# Patient Record
Sex: FEMALE
[De-identification: names, ages, dates, MRNs, and addresses within clinical notes are randomized per-mention and may not be internally consistent; named-entity substitution may affect disease eponyms.]

---

## 2018-06-25 ENCOUNTER — HOSPITAL ENCOUNTER (EMERGENCY)
Dept: HOSPITAL 97 - ER | Age: 48
Discharge: HOME | End: 2018-06-25
Payer: COMMERCIAL

## 2018-06-25 DIAGNOSIS — R53.1: ICD-10-CM

## 2018-06-25 DIAGNOSIS — R19.7: ICD-10-CM

## 2018-06-25 DIAGNOSIS — H82.9: ICD-10-CM

## 2018-06-25 DIAGNOSIS — Z88.0: ICD-10-CM

## 2018-06-25 DIAGNOSIS — R11.10: Primary | ICD-10-CM

## 2018-06-25 LAB
ALBUMIN SERPL BCP-MCNC: 3.9 G/DL (ref 3.4–5)
ALP SERPL-CCNC: 61 U/L (ref 45–117)
ALT SERPL W P-5'-P-CCNC: 32 U/L (ref 12–78)
AST SERPL W P-5'-P-CCNC: 19 U/L (ref 15–37)
BUN BLD-MCNC: 14 MG/DL (ref 7–18)
CKMB CREATINE KINASE MB: < 1 NG/ML (ref 0.3–3.6)
GLUCOSE SERPLBLD-MCNC: 123 MG/DL (ref 74–106)
HCT VFR BLD CALC: 41 % (ref 36–45)
INR BLD: 1.01
LIPASE SERPL-CCNC: 105 U/L (ref 73–393)
LYMPHOCYTES # SPEC AUTO: 0.9 K/UL (ref 0.7–4.9)
MAGNESIUM SERPL-MCNC: 1.9 MG/DL (ref 1.8–2.4)
MCH RBC QN AUTO: 29.3 PG (ref 27–35)
MCV RBC: 87.3 FL (ref 80–100)
MORPHOLOGY BLD-IMP: (no result)
NT-PROBNP SERPL-MCNC: 37 PG/ML (ref ?–125)
PMV BLD: 10.1 FL (ref 7.6–11.3)
POTASSIUM SERPL-SCNC: 3.6 MMOL/L (ref 3.5–5.1)
RBC # BLD: 4.69 M/UL (ref 3.86–4.86)

## 2018-06-25 PROCEDURE — 82962 GLUCOSE BLOOD TEST: CPT

## 2018-06-25 PROCEDURE — 36415 COLL VENOUS BLD VENIPUNCTURE: CPT

## 2018-06-25 PROCEDURE — 83735 ASSAY OF MAGNESIUM: CPT

## 2018-06-25 PROCEDURE — 80048 BASIC METABOLIC PNL TOTAL CA: CPT

## 2018-06-25 PROCEDURE — 82553 CREATINE MB FRACTION: CPT

## 2018-06-25 PROCEDURE — 93005 ELECTROCARDIOGRAM TRACING: CPT

## 2018-06-25 PROCEDURE — 96374 THER/PROPH/DIAG INJ IV PUSH: CPT

## 2018-06-25 PROCEDURE — 81025 URINE PREGNANCY TEST: CPT

## 2018-06-25 PROCEDURE — 81003 URINALYSIS AUTO W/O SCOPE: CPT

## 2018-06-25 PROCEDURE — 82550 ASSAY OF CK (CPK): CPT

## 2018-06-25 PROCEDURE — 85025 COMPLETE CBC W/AUTO DIFF WBC: CPT

## 2018-06-25 PROCEDURE — 83690 ASSAY OF LIPASE: CPT

## 2018-06-25 PROCEDURE — 80076 HEPATIC FUNCTION PANEL: CPT

## 2018-06-25 PROCEDURE — 84484 ASSAY OF TROPONIN QUANT: CPT

## 2018-06-25 PROCEDURE — 99284 EMERGENCY DEPT VISIT MOD MDM: CPT

## 2018-06-25 PROCEDURE — 83880 ASSAY OF NATRIURETIC PEPTIDE: CPT

## 2018-06-25 PROCEDURE — 96375 TX/PRO/DX INJ NEW DRUG ADDON: CPT

## 2018-06-25 PROCEDURE — 85610 PROTHROMBIN TIME: CPT

## 2018-06-25 PROCEDURE — 85730 THROMBOPLASTIN TIME PARTIAL: CPT

## 2018-06-25 PROCEDURE — 71045 X-RAY EXAM CHEST 1 VIEW: CPT

## 2018-06-25 PROCEDURE — 96361 HYDRATE IV INFUSION ADD-ON: CPT

## 2018-06-25 NOTE — ER
Nurse's Notes                                                                                     

 [facility]                                                                

Name: Cait Ace                                                                                  

Age: 47 yrs                                                                                       

Sex: Female                                                                                       

: 1970                                                                                   

MRN: [id number]                                                                                   

Arrival Date: 2018                                                                          

Time: 17:13                                                                                       

Account#: [id number]                                                                            

Bed 25                                                                                            

Private MD: None, None                                                                            

Diagnosis: Vomiting;Diarrhea, unspecified;Vertiginous syndromes in diseases classified elsewhere, 

  unspecified ear;Weakness                                                                        

                                                                                                  

Presentation:                                                                                     

                                                                                             

17:24 Presenting complaint: Patient states: dizziness, nausea, vomiting x1, diarrhea x2,      ss  

      chills that began 5.5 hours ago. Pt reports she feels as if the room is spinning when       

      she moves her head. Transition of care: patient was not received from another setting       

      of care. Onset of symptoms was 2018 at 12:00. Risk Assessment: Do you want to      

      hurt yourself or someone else? Patient reports no desire to harm self or others.            

      Initial Sepsis Screen: Does the patient meet any 2 criteria? No. Patient's initial          

      sepsis screen is negative. Does the patient have a suspected source of infection? No.       

      Patient's initial sepsis screen is negative. Care prior to arrival: None.                   

17:24 Method Of Arrival: Ambulatory                                                           ss  

17:24 Acuity: MIAN 3                                                                           ss  

                                                                                                  

Historical:                                                                                       

- Allergies:                                                                                      

17:26 PENICILLINS;                                                                            ss  

- Home Meds:                                                                                      

17:26 None [Active];                                                                          ss  

- PMHx:                                                                                           

17:26 None;                                                                                   ss  

- PSHx:                                                                                           

17:26 Knee surgery;                                                                           ss  

                                                                                                  

- Immunization history:: Adult Immunizations up to date.                                          

- Social history:: Smoking status: Patient/guardian denies using tobacco.                         

- Ebola Screening: : Patient denies exposure to infectious person Patient denies travel           

  to an Ebola-affected area in the 21 days before illness onset.                                  

                                                                                                  

                                                                                                  

Screenin:14 Abuse screen: Denies threats or abuse. Denies injuries from another. Nutritional        hb  

      screening: No deficits noted. Tuberculosis screening: No symptoms or risk factors           

      identified. Fall Risk None identified.                                                      

                                                                                                  

Assessment:                                                                                       

17:30 General: Appears in no apparent distress. Behavior is calm, cooperative. Pain: Denies   hb  

      pain. Neuro: Level of Consciousness is awake, alert, obeys commands, Oriented to            

      person, place, time, situation. Cardiovascular: Heart tones S1 S2 present Capillary         

      refill < 3 seconds Patient's skin is warm and dry. Respiratory: Airway is patent            

      Trachea midline Respiratory effort is even, unlabored, Respiratory pattern is regular,      

      symmetrical, Breath sounds are clear bilaterally. GI: Abdomen is non-distended, Bowel       

      sounds present X 4 quads. Abd is soft and non tender X 4 quads. Reports diarrhea,           

      nausea, vomiting. : No signs and/or symptoms were reported regarding the                  

      genitourinary system. EENT: No signs and/or symptoms were reported regarding the EENT       

      system. Derm: No signs and/or symptoms reported regarding the dermatologic system. Skin     

      is intact, is healthy with good turgor, Skin is pink, warm \T\ dry. Musculoskeletal: No     

      signs and/or symptoms reported regarding the musculoskeletal system.                        

18:25 Reassessment: Patient appears in no apparent distress at this time. No changes from     hb  

      previously documented assessment. Patient and/or family updated on plan of care and         

      expected duration. Pain level reassessed. Patient is alert, oriented x 3, equal             

      unlabored respirations, skin warm/dry/pink.                                                 

                                                                                                  

Vital Signs:                                                                                      

17:26  / 86; Pulse 75; Resp 17; Pulse Ox 99% on R/A; Height 5 ft. 2 in. (157.48 cm);    ss  

      Pain 0/10;                                                                                  

17:30 Temp 97.7(TE);                                                                          ss  

18:25  / 78; Pulse 89; Resp 16; Pulse Ox 100% on R/A;                                   hb  

19:17  / 78; Pulse 76; Resp 18; Temp 98.5; Pulse Ox 99% on R/A;                         aj  

                                                                                                  

ED Course:                                                                                        

17:13 Patient arrived in ED.                                                                  sb2 

17:13 None, None is Private Physician.                                                        sb2 

17:21 Milagro Mcrae, RN is Primary Nurse.                                                   hb  

17:25 Triage completed.                                                                       ss  

17:26 Arm band placed on right wrist.                                                         ss  

17:28 Alcon Valencia MD is Attending Physician.                                             aakash 

17:36 Inserted saline lock: 20 gauge in left antecubital area, using aseptic technique. Blood ss  

      collected. Patient maintains SpO2 saturation greater than 95% on room air.                  

17:45 Patient has correct armband on for positive identification. Placed in gown. Bed in low  hb  

      position. Call light in reach. Side rails up X 1.                                           

17:58 EKG done, by EKG tech. reviewed by Alcon Valencia MD.                                   sm3 

18:23 X-ray completed. Portable x-ray completed in exam room. Patient tolerated procedure     kc2 

      well.                                                                                       

18:24 XRAY Chest (1 view) In Process Unspecified.                                             EDMS

19:17 No provider procedures requiring assistance completed. IV discontinued, bleeding        aj  

      controlled, No redness/swelling at site. Pressure dressing applied.                         

                                                                                                  

Administered Medications:                                                                         

18:13 Drug: NS 0.9% 1000 ml Route: IV; Rate: 1 bolus; Site: left antecubital;                 hb  

19:21 Follow up: Response: No adverse reaction; IV Status: Order to discontinue infusion; IV  aj  

      Intake: 100ml                                                                               

18:13 Drug: Zofran 4 mg Route: IVP; Site: left antecubital;                                   hb  

18:54 Follow up: Response: No adverse reaction                                                hb  

18:54 Drug: Meclizine 50 mg Route: PO;                                                        hb  

19:21 Follow up: Response: No adverse reaction                                                aj  

18:54 Drug: NS 0.9% 1000 ml Route: IV; Rate: 1 bolus; Site: left antecubital;                 hb  

19:20 Follow up: Response: No adverse reaction; IV Status: Completed infusion; IV Intake:     aj  

      1000ml                                                                                      

18:54 Drug: Pepcid 20 mg Route: IVP; Site: left antecubital;                                  hb  

19:20 Follow up: Response: No adverse reaction                                                  

                                                                                                  

                                                                                                  

Point of Care Testing:                                                                            

      Blood Glucose:                                                                              

17:36 Blood Glucose: 119 mg/dL;                                                               ss  

      Ranges:                                                                                     

                                                                                                  

Intake:                                                                                           

19:20 IV: 1000ml; Total: 1000ml.                                                              aj  

19:21 IV: 100ml; Total: 1100ml.                                                               aj  

                                                                                                  

Outcome:                                                                                          

19:07 Discharge ordered by MD.                                                                aakash 

19:17 Discharged to home ambulatory, with family.                                             aj  

19:17 Condition: good                                                                             

19:17 Discharge instructions given to patient, Instructed on discharge instructions, follow       

      up and referral plans. medication usage, Demonstrated understanding of instructions,        

      follow-up care, medications, Prescriptions given X 3.                                       

19:22 Patient left the ED.                                                                    aj  

                                                                                                  

Signatures:                                                                                       

Dispatcher [employer]                           EDAmna Irvin RN RN aj Anderson, Corey, MD MD cha Smirch, Shelby, RN RN                                                      

Milagro Mcrae RN RN hb Carr, Kelsie                                 kc2                                                  

Thao Jean                               sb2                                                  

Mendy Bermudez                              3                                                  

                                                                                                  

**************************************************************************************************

## 2018-06-25 NOTE — EKG
Test Date:    2018-06-25               Test Time:    17:52:14

Technician:   RANJAN                                     

                                                     

MEASUREMENT RESULTS:                                       

Intervals:                                           

Rate:         70                                     

IL:           170                                    

QRSD:         90                                     

QT:           422                                    

QTc:          455                                    

Axis:                                                

P:            31                                     

IL:           170                                    

QRS:          7                                      

T:            35                                     

                                                     

INTERPRETIVE STATEMENTS:                                       

                                                     

Normal sinus rhythm

Normal ECG

No previous ECG available for comparison



Electronically Signed On 06-25-18 20:51:37 CDT by Raleigh Mccurdy

## 2018-06-25 NOTE — RAD REPORT
EXAM DESCRIPTION:  RAD - Chest Single View - 6/25/2018 6:24 pm

 

CLINICAL HISTORY:  COUGH

Chest pain.

 

COMPARISON:  No comparisons

 

FINDINGS:  Portable technique limits examination quality.

 

The lungs are grossly clear. The heart is normal in size. No displaced fractures.

 

IMPRESSION:  No acute intrathoracic process suspected.

## 2018-06-25 NOTE — EDPHYS
Physician Documentation                                                                           

 [facility]                                                                

Name: Cait Ace                                                                                  

Age: 47 yrs                                                                                       

Sex: Female                                                                                       

: 1970                                                                                   

MRN: [id number]                                                                                   

Arrival Date: 2018                                                                          

Time: 17:13                                                                                       

Account#: [id number]                                                                            

Bed 25                                                                                            

Private MD: None, None                                                                            

ED Physician Alcon Valencia                                                                      

HPI:                                                                                              

                                                                                             

18:35 This 47 yrs old  Female presents to ER via Ambulatory with complaints of        aakash 

      Weakness, Nausea/Vomiting/Diarrhea.                                                         

                                                                                                  

Historical:                                                                                       

- Allergies:                                                                                      

17:26 PENICILLINS;                                                                            ss  

- Home Meds:                                                                                      

17:26 None [Active];                                                                          ss  

- PMHx:                                                                                           

17:26 None;                                                                                   ss  

- PSHx:                                                                                           

17:26 Knee surgery;                                                                           ss  

                                                                                                  

- Immunization history:: Adult Immunizations up to date.                                          

- Social history:: Smoking status: Patient/guardian denies using tobacco.                         

- Ebola Screening: : Patient denies exposure to infectious person Patient denies travel           

  to an Ebola-affected area in the 21 days before illness onset.                                  

                                                                                                  

                                                                                                  

ROS:                                                                                              

18:35 Constitutional: Negative for fever, chills, and weight loss, Eyes: Negative for injury, aakash 

      pain, redness, and discharge, ENT: Negative for injury, pain, and discharge, Neck:          

      Negative for injury, pain, and swelling, Cardiovascular: Negative for chest pain,           

      palpitations, and edema, Respiratory: Negative for shortness of breath, cough,              

      wheezing, and pleuritic chest pain, Back: Negative for injury and pain, : Negative        

      for injury, bleeding, discharge, and swelling, MS/Extremity: Negative for injury and        

      deformity, Skin: Negative for injury, rash, and discoloration, Psych: Negative for          

      depression, anxiety, suicide ideation, homicidal ideation, and hallucinations,              

      Allergy/Immunology: Negative for hives, rash, and allergies, Endocrine: Negative for        

      neck swelling, polydipsia, polyuria, polyphagia, and marked weight changes,                 

      Hematologic/Lymphatic: Negative for swollen nodes, abnormal bleeding, and unusual           

      bruising.                                                                                   

18:35 Abdomen/GI: Positive for nausea and vomiting, diarrhea.                                     

18:35 Neuro: Positive for dizziness, weakness.                                                    

                                                                                                  

Exam:                                                                                             

18:35 Constitutional:  This is a well developed, well nourished patient who is awake, alert,  aakash 

      and in no acute distress. Head/Face:  Normocephalic, atraumatic. Eyes:  Pupils equal        

      round and reactive to light, extra-ocular motions intact.  Lids and lashes normal.          

      Conjunctiva and sclera are non-icteric and not injected.  Cornea within normal limits.      

      Periorbital areas with no swelling, redness, or edema. ENT:  Nares patent. No nasal         

      discharge, no septal abnormalities noted.  Tympanic membranes are normal and external       

      auditory canals are clear.  Oropharynx with no redness, swelling, or masses, exudates,      

      or evidence of obstruction, uvula midline.  Mucous membranes moist. Neck:  Trachea          

      midline, no thyromegaly or masses palpated, and no cervical lymphadenopathy.  Supple,       

      full range of motion without nuchal rigidity, or vertebral point tenderness.  No            

      Meningismus. Chest/axilla:  Normal chest wall appearance and motion.  Nontender with no     

      deformity.  No lesions are appreciated. Cardiovascular:  Regular rate and rhythm with a     

      normal S1 and S2.  No gallops, murmurs, or rubs.  Normal PMI, no JVD.  No pulse             

      deficits. Respiratory:  Lungs have equal breath sounds bilaterally, clear to                

      auscultation and percussion.  No rales, rhonchi or wheezes noted.  No increased work of     

      breathing, no retractions or nasal flaring. Back:  No spinal tenderness.  No                

      costovertebral tenderness.  Full range of motion. Female :  Normal external               

      genitalia. Skin:  Warm, dry with normal turgor.  Normal color with no rashes, no            

      lesions, and no evidence of cellulitis. MS/ Extremity:  Pulses equal, no cyanosis.          

      Neurovascular intact.  Full, normal range of motion. Neuro:  Awake and alert, GCS 15,       

      oriented to person, place, time, and situation.  Cranial nerves II-XII grossly intact.      

      Motor strength 5/5 in all extremities.  Sensory grossly intact.  Cerebellar exam            

      normal.  Normal gait. Psych:  Awake, alert, with orientation to person, place and time.     

       Behavior, mood, and affect are within normal limits.                                       

18:35 Abdomen/GI: Inspection: abdomen appears normal, Bowel sounds: normal, Palpation:            

      nontender, Liver: no appreciated palpable abnormalities, Hernia: not appreciated.           

19:06 Neuro: Orientation: is normal, appropriate for stated age, no acute changes, Mentation: aakash 

      is normal, appropriate for stated age, no acute changes, Memory: is normal, appropriate     

      for stated age, no acute changes, Cranial nerves: grossly normal, is grossly normal         

      based on the patient's age, no acute changes, Cerebellar function: is grossly normal,       

      is grossly normal based on the patient's age, no acute changes, Motor: is grossly           

      normal based on the patient's age, no acute changes, moves all fours, Sensation: no         

      obvious gross deficits, appropriate  no acute changes, Gait: is steady, appropriate for     

      age, Deep tendon reflexes are 2+ (normal) in the  bilateral brachioradialis, bicep,         

      tricep and patellar and Achilles tendons, Babinski testing is normal, seizure activity,     

      is not displayed by the patient.                                                            

                                                                                                  

Vital Signs:                                                                                      

17:26  / 86; Pulse 75; Resp 17; Pulse Ox 99% on R/A; Height 5 ft. 2 in. (157.48 cm);    ss  

      Pain 0/10;                                                                                  

17:30 Temp 97.7(TE);                                                                          ss  

18:25  / 78; Pulse 89; Resp 16; Pulse Ox 100% on R/A;                                   hb  

19:17  / 78; Pulse 76; Resp 18; Temp 98.5; Pulse Ox 99% on R/A;                         aj  

                                                                                                  

MDM:                                                                                              

17:28 Patient medically screened.                                                             [facility] 

18:37 Data reviewed: vital signs, nurses notes, lab test result(s), EKG, radiologic studies,  [facility] 

      plain films.                                                                                

                                                                                                  

                                                                                             

17:41 Order name: Basic Metabolic Panel; Complete Time: 19:06                                 [facility] 

                                                                                             

17:41 Order name: CBC with Diff                                                               [facility] 

                                                                                             

17:41 Order name: Ckmb; Complete Time: 19:06                                                  [facility] 

                                                                                             

17:41 Order name: CPK; Complete Time: 19:06                                                   [facility] 

                                                                                             

17:41 Order name: LFT's; Complete Time: 19:06                                                 [facility] 

                                                                                             

17:41 Order name: Magnesium; Complete Time: 19:06                                             [facility] 

                                                                                             

17:41 Order name: NT PRO-BNP; Complete Time: 19:06                                            [facility] 

                                                                                             

17:41 Order name: PT-INR                                                                      [facility] 

                                                                                             

17:41 Order name: Ptt, Activated                                                              [facility] 

                                                                                             

17:41 Order name: Troponin (emerg Dept Use Only); Complete Time: 19:06                        [facility] 

                                                                                             

17:41 Order name: Lipase; Complete Time: 19:06                                                [facility] 

                                                                                             

17:41 Order name: Urine Pregnancy Test (obtain specimen); Complete Time: 18:55                [facility] 

                                                                                             

17:41 Order name: XRAY Chest (1 view); Complete Time: 19:06                                   [facility] 

                                                                                             

17:41 Order name: EKG; Complete Time: 17:42                                                   [facility] 

                                                                                             

17:41 Order name: Cardiac monitoring; Complete Time: 18:14                                    [facility] 

                                                                                             

17:41 Order name: EKG - Nurse/Tech; Complete Time: 18:14                                      [facility] 

                                                                                             

17:41 Order name: IV Saline Lock; Complete Time: 18:14                                        [facility] 

                                                                                             

17:41 Order name: Labs collected and sent; Complete Time: 18:14                               [facility] 

                                                                                             

17:41 Order name: O2 Per Protocol; Complete Time: 18:14                                       [facility] 

                                                                                             

18:48 Order name: Urine Dipstick--Ancillary (enter results)                                     

                                                                                             

18:48 Order name: Urine Pregnancy--Ancillary (enter results)                                    

                                                                                             

17:41 Order name: O2 Sat Monitoring; Complete Time: 18:14                                     [facility] 

                                                                                             

17:41 Order name: Urine Dipstick-Ancillary (obtain specimen); Complete Time: 19:11            [facility] 

                                                                                             

18:41 Order name: Labs - recollect needed; Complete Time: 19:08                               bd  

                                                                                                  

Administered Medications:                                                                         

18:13 Drug: NS 0.9% 1000 ml Route: IV; Rate: 1 bolus; Site: left antecubital;                 hb  

19:21 Follow up: Response: No adverse reaction; IV Status: Order to discontinue infusion; IV  aj  

      Intake: 100ml                                                                               

18:13 Drug: Zofran 4 mg Route: IVP; Site: left antecubital;                                   hb  

18:54 Follow up: Response: No adverse reaction                                                hb  

18:54 Drug: Meclizine 50 mg Route: PO;                                                        hb  

19:21 Follow up: Response: No adverse reaction                                                aj  

18:54 Drug: NS 0.9% 1000 ml Route: IV; Rate: 1 bolus; Site: left antecubital;                 hb  

19:20 Follow up: Response: No adverse reaction; IV Status: Completed infusion; IV Intake:     aj  

      1000ml                                                                                      

18:54 Drug: Pepcid 20 mg Route: IVP; Site: left antecubital;                                  hb  

19:20 Follow up: Response: No adverse reaction                                                  

                                                                                                  

                                                                                                  

Point of Care Testing:                                                                            

      Blood Glucose:                                                                              

17:36 Blood Glucose: 119 mg/dL;                                                               ss  

      Ranges:                                                                                     

      Critical Glucose Levels:Adult <50 mg/dl or >400 mg/dl  <40 mg/dl or >180 mg/dl       

Disposition:                                                                                      

18 19:07 Discharged to Home. Impression: Vomiting, Diarrhea, unspecified, Vertiginous       

  syndromes in diseases classified elsewhere, unspecified ear, Weakness.                          

- Condition is Stable.                                                                            

- Discharge Instructions: Food Choices to Help Relieve Diarrhea, Adult, Diarrhea,                 

  Nausea and Vomiting, Vertigo, Weakness, Nausea and Vomiting, Easy-to-Read, Diarrhea,            

  Easy-to-Read, Vertigo, Easy-to-Read, Weakness, Easy-to-Read.                                    

- Prescriptions for Meclizine 25 mg Oral Tablet - take 1 tablet by ORAL route every 8             

  hours As needed; 30 tablet. Pepcid 20 mg Oral Tablet - take 1 tablet by ORAL route              

  every 12 hours for 10 days; 20 tablet. Zofran 4 mg Oral Tablet - take 1 tablet by               

  ORAL route every 12 hours As needed; 20 tablet.                                                 

- Medication Reconciliation Form, Thank You Letter, Antibiotic Education, Prescription            

  Opioid Use form.                                                                                

- Follow up: Private Physician; When: 2 - 3 days; Reason: Recheck today's complaints,             

  Continuance of care, Re-evaluation by your physician.                                           

- Problem is new.                                                                                 

- Symptoms have improved.                                                                         

                                                                                                  

                                                                                                  

                                                                                                  

Signatures:                                                                                       

Dispatcher [employer]                           [facility]                                                 

Monica Dominguez Amanda, DARREL                       RN   Alcon Castañeda MD MD cha Smirch, Shelby, RN RN ss Baxter, Heather, RN RN                                                      

                                                                                                  

Corrections: (The following items were deleted from the chart)                                    

17:43 17:42 Occult Blood+[id number].BRZ ordered. [facility]                                             EDMS

19:22 19:07 2018 19:07 Discharged to Home. Impression: Vomiting; Diarrhea, unspecified; aj  

      Vertiginous syndromes in diseases classified elsewhere, unspecified ear; Weakness.          

      Condition is Stable. Discharge Instructions: Food Choices to Help Relieve Diarrhea,         

      Adult, Diarrhea, Nausea and Vomiting, Vertigo, Weakness, Nausea and Vomiting,               

      Easy-to-Read, Diarrhea, Easy-to-Read, Vertigo, Easy-to-Read, Weakness, Easy-to-Read.        

      Prescriptions for Meclizine 25 mg Oral Tablet - take 1 tablet by ORAL route every 8         

      hours As needed; 30 tablet, Pepcid 20 mg Oral Tablet - take 1 tablet by ORAL route          

      every 12 hours for 10 days; 20 tablet, Zofran 4 mg Oral Tablet - take 1 tablet by ORAL      

      route every 12 hours As needed; 20 tablet. and Forms are Medication Reconciliation          

      Form, Thank You Letter, Antibiotic Education, Prescription Opioid Use. Follow up:           

      Private Physician; When: 2 - 3 days; Reason: Recheck today's complaints, Continuance of     

      care, Re-evaluation by your physician. Problem is new. Symptoms have improved. aakash          

                                                                                                  

**************************************************************************************************

## 2020-07-08 ENCOUNTER — HOSPITAL ENCOUNTER (EMERGENCY)
Dept: HOSPITAL 97 - ER | Age: 50
Discharge: HOME | End: 2020-07-08
Payer: COMMERCIAL

## 2020-07-08 VITALS — OXYGEN SATURATION: 100 % | SYSTOLIC BLOOD PRESSURE: 128 MMHG | DIASTOLIC BLOOD PRESSURE: 84 MMHG

## 2020-07-08 VITALS — TEMPERATURE: 98.9 F

## 2020-07-08 DIAGNOSIS — H82.9: ICD-10-CM

## 2020-07-08 DIAGNOSIS — R42: Primary | ICD-10-CM

## 2020-07-08 DIAGNOSIS — Z88.0: ICD-10-CM

## 2020-07-08 PROCEDURE — 99283 EMERGENCY DEPT VISIT LOW MDM: CPT

## 2020-07-08 NOTE — ER
Nurse's Notes                                                                                     

 [facility]                                                                 

Name: Cait Ace                                                                                  

Age: 50 yrs                                                                                       

Sex: Female                                                                                       

: 1970                                                                                   

MRN: [id number]                                                                                   

Arrival Date: 2020                                                                          

Time: 09:35                                                                                       

Account#: [id number]                                                                            

Bed 3                                                                                             

Private MD:                                                                                       

Diagnosis: Vertiginous syndromes in diseases classified elsewhere                                 

                                                                                                  

Presentation:                                                                                     

                                                                                             

09:40 Chief complaint: Patient states: Dizziness and nausea that began 2 days ago. Is worse   ss  

      when moving head or standing up too fast. Pt reports a history of vertigo and reports       

      this feels similar and she is trying to catch it early and get the medications that         

      worked last time because they are going out of town soon. Coronavirus screen: Proceed       

      with normal triage. Patient denies a cough. Patient denies shortness of breath or           

      difficulty breathing. Patient denies measured and/or subjective temperature greater         

      than 100.4F prior to today's visit. Patient denies travel on a cruise ship or to a          

      country the [facility] currently lists as an affected area. Patient denies contact with known      

      and/or suspected case of COVID-19. Ebola Screen: Patient denies exposure to infectious      

      person. Patient denies travel to an Ebola-affected area in the 21 days before illness       

      onset. Initial Sepsis Screen: Does the patient meet any 2 criteria? No. Patient's           

      initial sepsis screen is negative. Does the patient have a suspected source of              

      infection? No. Patient's initial sepsis screen is negative. Risk Assessment: Do you         

      want to hurt yourself or someone else? Patient reports no desire to harm self or            

      others. Onset of symptoms was 2020.                                                

09:40 Method Of Arrival: Ambulatory                                                           ss  

09:40 Acuity: IMNA 3                                                                           ss  

                                                                                                  

Historical:                                                                                       

- Allergies:                                                                                      

09:51 PENICILLINS;                                                                            ss  

- Home Meds:                                                                                      

09:51 None [Active];                                                                          ss  

- PMHx:                                                                                           

09:51 Vertigo;                                                                                ss  

- PSHx:                                                                                           

09:51 Knee surgery;                                                                           ss  

                                                                                                  

- Immunization history:: Adult Immunizations up to date.                                          

- Social history:: Smoking status: Patient denies any tobacco usage or history of.                

                                                                                                  

                                                                                                  

Screenin:42 Abuse screen: Denies threats or abuse. Denies injuries from another. Nutritional        sv  

      screening: No deficits noted. Tuberculosis screening: No symptoms or risk factors           

      identified. Fall Risk                                                                       

                                                                                                  

Assessment:                                                                                       

11:35 General: Appears in no apparent distress. comfortable, well groomed, well developed,    sv  

      Behavior is calm, cooperative, appropriate for age. Pain: Denies pain. Neuro: Level of      

      Consciousness is awake, alert, obeys commands, Oriented to person, place, time,             

      situation, Moves all extremities. Full function Gait is steady, Speech is normal,           

      Facial symmetry appears normal, Reports dizziness. Respiratory: Airway is patent            

      Respiratory effort is even, unlabored, Respiratory pattern is regular, symmetrical. GI:     

      Reports nausea. Derm: Skin is pink, warm \T\ dry. Musculoskeletal: Range of motion:         

      intact in all extremities.                                                                  

12:05 Reassessment: Patient appears in no apparent distress at this time. No changes from     sv  

      previously documented assessment. Patient and/or family updated on plan of care and         

      expected duration. Pain level reassessed. Patient is alert, oriented x 3, equal             

      unlabored respirations, skin warm/dry/pink.                                                 

                                                                                                  

Vital Signs:                                                                                      

09:40  / 87; Pulse 57; Resp 15; Temp 98.9; Pulse Ox 96% on R/A; Weight 69.85 kg; Height ss  

      5 ft. 2 in. (157.48 cm); Pain 0/10;                                                         

11:41  / 84; Pulse 56; Resp 18; Pulse Ox 100% ;                                         sv  

09:40 Body Mass Index 28.17 (69.85 kg, 157.48 cm)                                               

                                                                                                  

ED Course:                                                                                        

09:35 Patient arrived in ED.                                                                  ag5 

09:44 Deshaun Sanchez MD is Attending Physician.                                              kdr 

09:50 Triage completed.                                                                       ss  

09:51 Arm band placed on right wrist.                                                         ss  

11:31 Karley Chavez, RN is Primary Nurse.                                                  sv  

11:42 Patient has correct armband on for positive identification. Bed in low position. Call   sv  

      light in reach. Pulse ox on. NIBP on. Door closed. Head of bed elevated.                    

11:43 ED physician to see patient.                                                            sv  

12:05 No provider procedures requiring assistance completed. Patient did not have IV access   sv  

      during this emergency room visit.                                                           

                                                                                                  

Administered Medications:                                                                         

11:50 Drug: Meclizine 25 mg Route: PO;                                                        sv  

12:05 Follow up: Response: No adverse reaction                                                sv  

11:51 Not Given (Pt doesn't have a ride home, she drove herself): Valium 5 mg PO once         sv  

                                                                                                  

                                                                                                  

Outcome:                                                                                          

11:54 Discharge ordered by MD.                                                                kdr 

12:05 Discharged to home ambulatory.                                                          sv  

12:05 Condition: stable                                                                           

12:05 Discharge instructions given to patient, Instructed on discharge instructions, follow       

      up and referral plans. medication usage, Demonstrated understanding of instructions,        

      follow-up care, medications, Prescriptions given X 2.                                       

12:05 Patient left the ED.                                                                    sv  

                                                                                                  

Signatures:                                                                                       

Karley Chavez, RN                    Deshaun De MD MD kdr Smirch, Shelby, RN RN ss Gaskin, Ajare                                ag5                                                  

                                                                                                  

**************************************************************************************************

## 2020-07-08 NOTE — EDPHYS
Physician Documentation                                                                           

 [facility]                                                                 

Name: Cait Ace                                                                                  

Age: 50 yrs                                                                                       

Sex: Female                                                                                       

: 1970                                                                                   

MRN: [id number]                                                                                   

Arrival Date: 2020                                                                          

Time: 09:35                                                                                       

Account#: [id number]                                                                            

Bed 3                                                                                             

Private MD:                                                                                       

ED Physician Deshaun Sanchez                                                                       

HPI:                                                                                              

                                                                                             

11:47 This 50 yrs old  Female presents to ER via Ambulatory with complaints of        kdr 

      Dizziness, Nausea.                                                                          

11:47 The patient presents with dizziness, feeling off balance, sense of spinning, vertigo.   kdr 

      Onset: The symptoms/episode began/occurred gradually, 3 day(s) ago. Context: occurred       

      at home, occurred while the patient was at rest, just prior to the episode the patient      

      experienced no apparent symptoms. Modifying factors: The symptoms are alleviated by         

      closing eyes, holding head still, the symptoms are aggravated by movement of head,          

      standing up, changing position. Associated signs and symptoms: Pertinent positives:         

      nausea. Severity of symptoms: At their worst the symptoms were mild in the emergency        

      department the symptoms are unchanged. The patient has experienced a previous episode,      

      approximately 2 years ago. The patient has not recently seen a physician.                   

                                                                                                  

Historical:                                                                                       

- Allergies:                                                                                      

09:51 PENICILLINS;                                                                            ss  

- Home Meds:                                                                                      

09:51 None [Active];                                                                          ss  

- PMHx:                                                                                           

09:51 Vertigo;                                                                                ss  

- PSHx:                                                                                           

09:51 Knee surgery;                                                                           ss  

                                                                                                  

- Immunization history:: Adult Immunizations up to date.                                          

- Social history:: Smoking status: Patient denies any tobacco usage or history of.                

                                                                                                  

                                                                                                  

ROS:                                                                                              

11:47 Constitutional: Negative for fever, chills, and weight loss, Eyes: Negative for injury, kdr 

      pain, redness, and discharge, ENT: Negative for injury, pain, and discharge, Neck:          

      Negative for injury, pain, and swelling, Cardiovascular: Negative for chest pain,           

      palpitations, and edema, Respiratory: Negative for shortness of breath, cough,              

      wheezing, and pleuritic chest pain, Abdomen/GI: Negative for abdominal pain, nausea,        

      vomiting, diarrhea, and constipation, Back: Negative for injury and pain, : Negative      

      for injury, bleeding, discharge, and swelling, MS/Extremity: Negative for injury and        

      deformity, Skin: Negative for injury, rash, and discoloration, Psych: Negative for          

      depression, anxiety, suicide ideation, homicidal ideation, and hallucinations,              

      Allergy/Immunology: Negative for hives, rash, and allergies, Endocrine: Negative for        

      neck swelling, polydipsia, polyuria, polyphagia, and marked weight changes,                 

      Hematologic/Lymphatic: Negative for swollen nodes, abnormal bleeding, and unusual           

      bruising.                                                                                   

11:47 Neuro: Positive for dizziness, Negative for altered mental status, gait disturbance,        

      headache, hearing loss, loss of consciousness, numbness, seizure activity, speech           

      changes, syncope, near syncope, tingling, tinnitus, tremor, visual changes, weakness.       

                                                                                                  

Exam:                                                                                             

11:47 Constitutional:  This is a well developed, well nourished patient who is awake, alert,  kdr 

      and in no acute distress. Head/Face:  Normocephalic, atraumatic. Eyes:  Pupils equal        

      round and reactive to light, extra-ocular motions intact.  Lids and lashes normal.          

      Conjunctiva and sclera are non-icteric and not injected.  Cornea within normal limits.      

      Periorbital areas with no swelling, redness, or edema. Neck:  Trachea midline, no           

      thyromegaly or masses palpated, and no cervical lymphadenopathy.  Supple, full range of     

      motion without nuchal rigidity, or vertebral point tenderness.  No Meningismus.             

      Chest/axilla:  Normal chest wall appearance and motion.  Nontender with no deformity.       

      No lesions are appreciated. Cardiovascular:  Regular rate and rhythm with a normal S1       

      and S2.  No gallops, murmurs, or rubs.  Normal PMI, no JVD.  No pulse deficits.             

      Respiratory:  Lungs have equal breath sounds bilaterally, clear to auscultation and         

      percussion.  No rales, rhonchi or wheezes noted.  No increased work of breathing, no        

      retractions or nasal flaring. Abdomen/GI:  Soft, non-tender, with normal bowel sounds.      

      No distension or tympany.  No guarding or rebound.  No evidence of tenderness               

      throughout. Back:  No spinal tenderness.  No costovertebral tenderness.  Full range of      

      motion. Skin:  Warm, dry with normal turgor.  Normal color with no rashes, no lesions,      

      and no evidence of cellulitis. MS/ Extremity:  Pulses equal, no cyanosis.                   

      Neurovascular intact.  Full, normal range of motion. Neuro:  Awake and alert, GCS 15,       

      oriented to person, place, time, and situation.  Cranial nerves II-XII grossly intact.      

      Motor strength 5/5 in all extremities.  Sensory grossly intact.  Cerebellar exam            

      normal.  Normal gait. Psych:  Awake, alert, with orientation to person, place and time.     

       Behavior, mood, and affect are within normal limits.                                       

                                                                                                  

Vital Signs:                                                                                      

09:40  / 87; Pulse 57; Resp 15; Temp 98.9; Pulse Ox 96% on R/A; Weight 69.85 kg; Height ss  

      5 ft. 2 in. (157.48 cm); Pain 0/10;                                                         

11:41  / 84; Pulse 56; Resp 18; Pulse Ox 100% ;                                         sv  

09:40 Body Mass Index 28.17 (69.85 kg, 157.48 cm)                                             ss  

                                                                                                  

MDM:                                                                                              

11:47 Data reviewed: vital signs, nurses notes. Counseling: I had a detailed discussion with  kdr 

      the patient and/or guardian regarding: the historical points, exam findings, and any        

      diagnostic results supporting the discharge/admit diagnosis, the need for outpatient        

      follow up.                                                                                  

11:54 Patient medically screened.                                                             kdr 

                                                                                                  

Administered Medications:                                                                         

11:50 Drug: Meclizine 25 mg Route: PO;                                                        sv  

12:05 Follow up: Response: No adverse reaction                                                sv  

11:51 Not Given (Pt doesn't have a ride home, she drove herself): Valium 5 mg PO once         sv  

                                                                                                  

                                                                                                  

Disposition:                                                                                      

20 11:54 Discharged to Home. Impression: Vertiginous syndromes in diseases classified       

  elsewhere.                                                                                      

- Condition is Stable.                                                                            

- Discharge Instructions: Vertigo, Easy-to-Read.                                                  

- Prescriptions for Meclizine 25 mg Oral Tablet - take 1 tablet by ORAL route every 8             

  hours As needed; 30 tablet. Zofran 4 mg Oral Tablet - take 1 tablet by ORAL route               

  every 12 hours As needed; 6 tablet.                                                             

- Medication Reconciliation Form, Thank You Letter form.                                          

- Follow up: Private Physician; When: 2 - 3 days; Reason: If symptoms return, Further             

  diagnostic work-up, Recheck today's complaints, Continuance of care, Re-evaluation by           

  your physician.                                                                                 

- Problem is new.                                                                                 

- Symptoms have improved.                                                                         

                                                                                                  

                                                                                                  

                                                                                                  

Signatures:                                                                                       

Karley Chavez, RN                    RN                                                      

Deshaun Sanchez MD MD   [facility]                                                  

Norma Driscoll RN                      RN   ss                                                   

                                                                                                  

Corrections: (The following items were deleted from the chart)                                    

12:05 11:54 2020 11:54 Discharged to Home. Impression: Vertiginous syndromes in         sv  

      diseases classified elsewhere. Condition is Stable. Forms are Medication Reconciliation     

      Form, Thank You Letter, Antibiotic Education, Prescription Opioid Use. Follow up:           

      Private Physician; When: 2 - 3 days; Reason: If symptoms return, Further diagnostic         

      work-up, Recheck today's complaints, Continuance of care, Re-evaluation by your             

      physician. Problem is new. Symptoms have improved. kdr                                      

                                                                                                  

**************************************************************************************************

## 2020-07-08 NOTE — XMS REPORT
Clinical Summary

                             Created on:2020



Patient:Cait Ace

Sex:Female

:1970

External Reference #:[id number]





Demographics







                          Address                   [address]



                                                    [place] [address]

 

                          Home Phone                [phone]

 

                          Mobile Phone              [phone]

 

                          Email Address             [email or web address]

 

                          Preferred Language        English

 

                          Marital Status            

 

                          Roman Catholic Affiliation     Unknown

 

                          Race                      White

 

                          Additional Race(s)         or [place] Na

tive

 

                          Ethnic Group               or 









Author







                          Organization              [facility]

 

                          Address                   [address] [place] [address]









Support







                Name            Relationship    Address         Phone

 

                Jay Ace    Spouse          Unavailable     [phone]









Care Team Providers







                    Name                Role                Phone

 

                    Asked,  Pcp         Primary Care Provider Unavailable









Allergies







             Active Allergy Reactions    Severity     Noted Date   Comments

 

             Penicillins  Rash         Low          02/10/2020   







Medications







          Medication Sig       Dispensed Refills   Start Date End Date  Status

 

          meloxicam (MOBIC) Take 1 tablet 30 tablet 11        2020

02 Active



          15 mg tablet (15 mg total)                               1         



                    by mouth                                          



                    daily.                                            

 

          moxifloxacin                     0         2020 Discon

tinued



          (VIGAMOX) 0.5 %                                         0         (The

rapy



          ophthalmic                                                   completed

)



          solution                                                    

 

          clindamycin Take 1    6 capsule 0         2020 



          (Cleocin HCL) 300 capsule (300                               0        

 



          MG capsule mg total) by                                         



                    mouth 3                                           



                    (three) times                                         



                    a day for 2                                         



                    days.                                             

 

          promethazine Take 1 tablet 20 tablet 0         2020 Ex

pired



          (PHENERGAN) 25 MG (25 mg total)                               0       

  



          tablet    by mouth                                          



                    every 6 (six)                                         



                    hours as                                          



                    needed for                                         



                    nausea or                                         



                    vomiting for                                         



                    up to 30                                          



                    days. To                                          



                    begin after                                         



                    surgery                                           







Active Problems







                          Problem                   Noted Date

 

                          Loose body of right knee  2020









                                        Overview: 







                                        Added automatically from request for chad

nely [id number]







Encounters







             Date         Type         Specialty    Care Team    Description

 

             2020   Travel                                 

 

             2020   Travel                                 

 

             2020   Travel                                 

 

             2020   Travel                                 

 

             2020   Travel                                 

 

             2020   Travel                                 

 

             2020   Travel                                 

 

             2020   Travel                                 

 

             2020   Travel                                 

 

             2020   Travel                                 

 

             2020   Travel                                 

 

             2020   Telephone    Orthopedic Surgery Valetnina Tolentino MA           

 

             2020   Office Visit Orthopedic Surgery Salas Dumont body of right



                                                                MONCHO GILLIAM



                                        knee (Primary Dx)



                                                    Nicholas Snyder MD           

 

             2020   Travel                                 

 

             2020   Telephone    Orthopedic Surgery Ki Mayra MA 

 

             2020   Anesthesia Event General Surgery Mario Mo MD Cheema, Ivelisse, FNP          

 

             2020   Surgery      General Surgery Nicholas Snyder, Right Ar

throscopy



                                                    MD           of the Knee wit

h



                                                                 Removal of Loos

e



                                                                 Body

 

             2020   Hospital Encounter General Surgery Nicholas Snyder MD           

 

             2020   Documentation Orthopedic Surgery Jennifer Salas DME (SX 

3/6/2020)

 

             2020   Orders Only  Orthopedic Surgery Jennifer Salas Loose bod

y of right



                                                                 knee (Primary D

x)

 

             2020   Orders Only  Orthopedic Surgery Salas Dumont PA       

 

             2020   Telephone    Orthopedic Surgery Laith Prater MA           

 

             2020   Telephone    Orthopedic Surgery Laith Prater MA           

 

             2020   Transcribe Orders Orthopedic Surgery Nicholas Snyder, 

Loose body of 

right



                                                    MD           knee (Primary D

x)

 

             2020   Refill       Orthopedic Surgery KiMayra MA 

 

             2020   Office Visit Orthopedic Surgery Nciholas Snyder, Loose

 body of right



                                                    MD           knee (Primary D

x)

 

             2020   Hospital Encounter Radiology    Nicholas Snyder, Tear 

of medial



                                                    MD           meniscus of rig

ht



                                                                 knee, current,



                                                                 unspecified tea

r



                                                                 type, initial



                                                                 encounter

 

             02/10/2020   Office Visit Orthopedic Surgery Nicholas Snyder, Tear 

of medial 

meniscus of right knee, current, unspecified tear type, initial encounter 
(Primary Dx);



                                                    MD           Chronic pain of

 both knees



after 2019



Family History







                Medical History Relation        Name            Comments

 

                No Known Problems Father                          

 

                No Known Problems Mother                          









                Relation        Name            Status          Comments

 

                Father                                  

 

                Mother                                  

 

                Other           siblings        Alive           

 

                Other           children        Alive           







Social History







             Tobacco Use  Types        Packs/Day    Years Used   Date

 

             Never Smoker              0            0            









                Smokeless Tobacco: Never Used                                 









                Alcohol Use     Drinks/Week     oz/Week         Comments

 

                Not Currently                                   









                          Sex Assigned at Birth     Date Recorded

 

                          Not on file               









                    Job Start Date      Occupation          Industry

 

                    Not on file         Not on file         Not on file









                    Travel History      Travel Start        Travel End









                                        No recent travel history available.







Last Filed Vital Signs







                Vital Sign      Reading         Time Taken      Comments

 

                Blood Pressure  108/66          2020 12:30 PM CST 

 

                Pulse           74              2020 12:30 PM CST 

 

                Temperature     37 C (98.6 F) 2020 11:43 AM CST 

 

                Respiratory Rate 18              2020 12:30 PM CST 

 

                Oxygen Saturation 97%             2020 12:15 PM CST 

 

                Inhaled Oxygen Concentration -               -               

 

                Weight          75.8 kg (167 lb) 2020  8:38 AM CST 

 

                Height          157.5 cm (5' 2") 2020  8:38 AM CST 

 

                Body Mass Index 30.54           2020  8:38 AM CST 







Plan of Treatment







                Health Maintenance Due Date        Last Done       Comments

 

                CERVICAL CANCER SCREENING 1990                      

 

                BREAST CANCER SCREENING 2019                      

 

                COLONOSCOPY SCREENING 2019                      

 

                SHINGLES VACCINES (#1) 2019                      

 

                INFLUENZA VACCINE 2020                      







Procedures







             Procedure Name Priority     Date/Time    Associated Diagnosis Comme

nts

 

             MI AN ELECTIVE Routine      2020 10:02              Results f

or this



             SUPRAGLOTTIC AIRWAY              AM CST                    procedur

e are in



                                                                 the results



                                                                 section.

 

             POC GLUCOSE  Routine      2020  8:49              Results for

 this



                                       AM CST                    procedure are i

n



                                                                 the results



                                                                 section.

 

             MRI KNEE WO CONTRAST Routine      2020  5:08 Tear of medial R

esults for this



             RIGHT                     PM CST       meniscus of right procedure 

are in



                                                    knee, current, the results



                                                    unspecified tear section.



                                                    type, initial 



                                                    encounter    

 

             XR KNEE 4+ VW Routine      02/10/2020  1:18 Chronic pain of both Re

sults for this



             BILATERAL                 PM CST       knees        procedure are i

n



                                                                 the results



                                                                 section.



after 2019



Results

Airway (2020 10:02 AM CST)





                          Narrative                 Performed At

 

                                        Hermilo Barrera CRNA   3/6/2020 10:02

 AM



                                        



                                        Airway



                                        



                                        Date/Time: 3/6/2020 9:58 AM



                                        



                                        Performed by: Hermilo Barrera CRNA



                                        



                                        Authorized by: Mario Mo MD 



                                        



                                         



                                        



                                        Location: OR



                                        



                                        Urgency: Elective



                                        



                                        Difficult Airway: No 



                                        



                                        Anesthesiologist: Mario Mo M D



                                        



                                        Resident/CRNA/AA: Hermilo Barrera CRNA



                                        



                                        Performed by: 



                                        



                                          anesthesiologist and resident/CRNA/A

A



                                        



                                        Preoxygenated with 100% O2: Yes 



                                        



                                        C-spine Precautions Maintained Throughou

t: Yes 



                                        



                                        Mask Ventilation: Not attempted



                                        



                                        Final Airway Type: Supraglottic airway



                                        



                                        Final LMA: Classic



                                        



                                        LMA Size: 3



                                        



                          Number of Attempts at Approach: 1 



POC glucose (2020  8:49 AM CST)





             Component    Value        Ref Range    Performed At Pathologist



                                                                 Signature

 

             POC glucose  106 (H)      65 - 99 mg/dL [facility] 



                          Comment:                  [facility] 



                           Name: Robin Guallpa                      

     



                          Device ID: [id number]                           



                          Chartable: RN Notified                           



                                                                 









                                        Specimen

 

                                        









                Performing Organization Address         City/State/Zipcode Phone

 Number

 

                [facility] DEPARTMENT OF PATHOLOGY [address] [address]. [place], 

X [address] 



                AND GENOMIC MEDICINE                                 

 

                [facility] [address] [address]

X [address] 



                hospitals                                        



MRI Knee Right  Wo Contrast (2020  5:08 PM CST)





                                        Specimen

 

                                        









                          Narrative                 Performed At

 

                                        This result has an attachment that is no

t available.



EXAMINATION: MRI KNEE WO CONTRAST RIGHT  [facility]



                                                    



                                        CLINICAL HISTORY: 49 years Female S83.24

1A Other tear of medial meniscus 

current injury right knee initial encounter, right knee pain 



                                                    



                                        TECHNIQUE: Multiplanar multisequence M

R imaging of the right knee was 

performed without contrast.             



                                                    



                          COMPARISON: Bilateral knee x-ray dated 2/10/2020 



                                                    



                          FINDINGS:                 



                                                    



                          Cruciate ligaments: Intact. 



                                                    



                          Menisci: Intact.          



                                                    



                          Collateral ligaments: Intact. 



                                                    



                                        Bone marrow: Minimal bone marrow edema a

nd subchondral cystic changes are 

present in the superior pole of the patella. Small osteophytes are present in 
the patella. Marrow signal is otherwise unremarkable. 



                                                    



                                        Articular cartilage: There is moderate c

hondromalacia involving the superior 

patellar cartilage particularly the median ridge and adjacent aspects of the 
medial and lateral patellar facets, greater late 



                          rally. Minimal subchondral edema is present in the 



                          patella. The articular cartilage is otherwise unremark

able. 



                                                    



                          Effusion: There is a small joint effusion with mild sy

novitis. 



                                                    



                          Extensor mechanism: Intact. 



                                                    



                          Soft tissues: No focal abnormality. 



                                                    



                          IMPRESSION:               



                                                    



                                        1.Degenerative disease patellofemoral zack

int with moderate chondromalacia 

involving the superior aspect of the patella involving the median ridge and 
adjacent aspects of the medial and lateral patellar facets, greater laterally. 



                          2.Small joint effusion with mild synovitis. 



                          3.No evidence of cruciate ligament, meniscal, or colla

teral ligament injury. 



                                                    



                          [id number]              









                                        Procedure Note

 

                                        Hm Interface, Radiology Results Incoming

 - 2020  5:21 PM CST



EXAMINATION:  MRI KNEE WO CONTRAST RIGHT



                                        



                                        CLINICAL HISTORY: 49 years Female S83.24

1A Other tear of medial meniscus  

current injury  right knee  initial encounter, right knee pain



                                        



                                        TECHNIQUE:  Multiplanar multisequence MR

 imaging of the right knee was performed

without contrast.



                                        



                                        COMPARISON:  Bilateral knee x-ray dated 

2/10/2020



                                        



                                        FINDINGS:



                                        



                                        Cruciate ligaments: Intact.



                                        



                                        Menisci: Intact.



                                        



                                        Collateral ligaments: Intact.



                                        



                                        Bone marrow: Minimal bone marrow edema a

nd subchondral cystic changes are 

present in the superior pole of the patella. Small osteophytes are present in 
the patella. Marrow signal is otherwise unremarkable.



                                        



                                        Articular cartilage: There is moderate c

hondromalacia involving the superior 

patellar cartilage particularly the median ridge and adjacent aspects of the 
medial and lateral patellar facets, greater laterally.



                                        Minimal subchondral edema is present in 

the



                                        patella. The articular cartilage is othe

rwise unremarkable.



                                        



                                        Effusion: There is a small joint effusio

n with mild synovitis.



                                        



                                        Extensor mechanism: Intact.



                                        



                                        Soft tissues: No focal abnormality.



                                        



                                        IMPRESSION:



                                        



                                        1.Degenerative disease patellofemoral zack

int with moderate chondromalacia 

involving the superior aspect of the patella involving the median ridge and 
adjacent aspects of the medial and lateral patellar facets, greater laterally.



                                        2.Small joint effusion with mild synovit

is.



                                        3.No evidence of cruciate ligament, meni

scal, or collateral ligament injury.



                                        



                                        [id number]









                Performing Organization Address         City/State/Zipcode Phone

 Number

 

                [facility]      [address] [place] [address] 



XR Knee 4+ Vw Bilateral (02/10/2020  1:18 PM CST)





                                        Specimen

 

                                        









                          Narrative                 Performed At

 

                                        This result has an attachment that is no

t available.



4 views (standing AP/PA, lateral and [employer]) of the bilateral knee(s)  

[facility]



                          reveal no evidence of fracture, dislocation or any oth

er acute or chronic 



                          osseous abnormalities.    



                                                    



                                                    









                Performing Organization Address         City/State/Zipcode Phone

 Number

 

                [facility]      [address] [place] [address] 



after 2019



Insurance







          Payer     Benefit Plan / Group Subscriber ID Effective Dates Phone    

 Address   Type

 

          [employer]     [employer] OPEN [employer]ESS/NETWORK xxxxxxxxxxx 2012-Present    

                 O









           Guarantor Name Account Type Relation to Date of    Phone      Billing

 Address



                                 Patient    Birth                 

 

           Cait Ace Personal/Famil Self       1970 [phone]







                      y                                (Home)     [place], [place]



                                                                  [address]







Advance Directives

For more information, please contact: [phone]





                Type            Date Recorded   Patient Representative Explanati

on

 

                Advance Directives, Living Will 3/6/2020  6:59 AM               

  



                and Medical Power of

## 2020-07-08 NOTE — XMS REPORT
Continuity of Care Document

                             Created on:2020



Patient:ISHA LORENZO

Sex:Female

:1970

External Reference #:[id number]





Demographics







                          Address                   [address] [place] [address]

 

                          Home Phone                [phone]

 

                          Mobile Phone              [phone]

 

                          Email Address             [email or web address]

 

                          Preferred Language        English

 

                          Marital Status            Unknown

 

                          Latter day Affiliation     Unknown

 

                          Race                      Unknown

 

                          Additional Race(s)        Unavailable



                                                    White



                                                     or [place] Na

tive

 

                          Ethnic Group              Unknown









Author







                          Organization              [facility]

t

 

                          Address                   [address] [place] [address] [place] [address]

 

                          Phone                     [phone]









Support







                Name            Relationship    Address         Phone

 

                Malka          Spouse          Unavailable     [phone]









Care Team Providers







                    Name                Role                Phone

 

                    Asked,  Pcp         Primary Care Physician Unavailable

 

                    MAFFET              Attending Clinician Unavailable

 

                    Rajan MOTTA            Attending Clinician Unavailable

 

                    AMNE Dumont      Attending Clinician [phone]

 

                    Ki MOTTA           Attending Clinician Unavailable

 

                    Chely MENDIOLA,  N.    Attending Clinician [phone]

 

                    Cristela MAGDALENO          Attending Clinician [phone]

 

                    Maritza               Attending Clinician Unavailable

 

                    Moi MOTTA           Attending Clinician Unavailable

 

                    MAFFET              Admitting Clinician Unavailable









Payers







           Payer Name Policy Type Policy Number Effective Date Expiration Date LUNA dunn [employer]            xxxxxxxxxxx 2012            [place]



           ACCESS/NETWORKxx                       00:00:00              [facility]

t



           [id number]/30/2                                             



           [id number]                                             







Problems







       Condition Condition Condition Status Onset  Resolution Last   Treating Co

mments 

Source



       Name   Details Category        Date   Date   Treatment Clinician        



                                                 Date                 

 

       Loose body Loose body Disease Active                       Overview

: [place]



       of right of right               24                        Added  [facility]

i



       knee   knee                 00:00:                      automatic st



                                   00                          ally from 



                                                               request 



                                                               for    



                                                               surgery 



                                                               [id number] 







Allergies, Adverse Reactions, Alerts







       Allergy Allergy Status Severity Reaction(s) Onset  Inactive Treating Comm

ents 

Source



       Name   Type                        Date   Date   Clinician        

 

       Penicill Propensi Active        Rash                         [place]

n



       ins    ty to                       2-10                        [facility]



              adverse                      00:00:                      st



              reaction                      00                          



              s to                                                    



              drug                                                    







Family History







           Family Member Diagnosis  Comments   Start Date Stop Date  Source

 

           Natural father No Known Problems                                  [place]

[place] [facility]

 

           Natural mother No Known Problems                                  [place]

[facility] [facility]







Social History







           Social Habit Start Date Stop Date  Quantity   Comments   Source

 

           Sex Assigned At                                             [facility] [facility]

[facility]ist



           Birth                                                  

 

           Alcohol intake 2020 Ex-drinker            [place] [facility]

[facility]



                      00:00:00   00:00:00   (finding)             









                Smoking Status  Start Date      Stop Date       Source

 

                Never smoker                                    Kishor Gamble

t







Medications







       Ordered Filled Start  Stop   Current Ordering Indication Dosage Frequency

 Signature

                    Comments            Components          Source



     Medication Medication Date Date Medication? Clinician                (SIG) 

          



     Name Name                                                   

 

     promethazin       No             25mg Q6H  Take 1           [facility]

ston



     e         3-02 04-01                          tablet (25           [facility]



     (PHENERGAN)      00:00: 23:59                          mg total)           

st



     25 MG      00   :00                           by mouth           



     tablet                                         every 6           



                                                  (six)           



                                                  hours as           



                                                  needed for           



                                                  nausea or           



                                                  vomiting           



                                                  for up to           



                                                  30 days.           



                                                  To begin           



                                                  after           



                                                  surgery           

 

     clindamycin       No             300mg [id number] Take 1       

    [place]



     (Cleocin      3-02 03-04                     [id number] capsule           M

[facility]i



     HCL) 300 MG      00:00: 23:59                     3D   (300 mg           st



     capsule      00   :00                           total) by           



                                                  mouth 3           



                                                  (three)           



                                                  times a           



                                                  day for 2           



                                                  days.           

 

     meloxicam       No             15mg QD   Take 1           [place]

on



     (MOBIC) 15                                tablet (15           Me

thodi



     mg tablet      00:00: 23:59                          mg total)           st



               00   :00                           by mouth           



                                                  daily.           

 

     moxifloxaci                                            [place]

on



     n (VIGAMOX)                                               [facility]



     0.5 %      00:00: 00:00                                         st



     ophthalmic      00   :00                                          



     solution                                                        







Vital Signs







             Vital Name   Observation Time Observation Value Comments     Source

 

             Systolic blood 2020 12:30:00 108 mm[Hg]                [place]

n [facility]



             pressure                                            

 

             Diastolic blood 2020 12:30:00 66 mm[Hg]                 [place]

on [facility]



             pressure                                            

 

             Heart rate   2020 12:30:00 74 /min                   Kishor Olmedo

 

             Respiratory rate 2020 12:30:00 18 /min                   Duncan Olmedo

 

             Oxygen saturation in 2020 12:15:00 97 /min                   

iKshor Olmedo



             Arterial blood by                                        



             Pulse oximetry                                        

 

             Body temperature 2020 11:43:00 37 Argelia                    Duncan Olmedo

 

             Body height  2020 08:38:00 157.5 cm                  Kishor Olmedo

 

             Body weight  2020 08:38:00 75.751 kg                 [place] 

[facility]

 

             BMI          2020 08:38:00 30.54 kg/m2               Kishor Olmedo







Procedures







                Procedure       Date / Time Performed Performing Clinician Sourc

e

 

                ID AN ELECTIVE  2020 10:02:11 HollyHermilo dillon   Kishor Reyes

odcaryl



                SUPRAGLOTTIC AIRWAY                                 

 

                POC GLUCOSE     2020 08:49:00 Jean-Claude Snyder

 

                MRI KNEE WO CONTRAST 2020 17:08:47 Jean-Claude Snyder



                RIGHT                                           

 

                XR KNEE 4+ VW BILATERAL 2020-02-10 13:18:11 Jean-Claude Snyder







Plan of Care







             Planned Activity Planned Date Details      Comments     Source

 

             Future Scheduled 2020   INFLUENZA VACCINE              [place]

[facility]



             Test         00:00:00     [code = INFLUENZA              



                                       VACCINE]                  

 

             Future Scheduled 2019   BREAST CANCER              [place] [facility]

[facility]



             Test         00:00:00     SCREENING [code =              



                                       BREAST CANCER              



                                       SCREENING]                

 

             Future Scheduled 2019   COLONOSCOPY SCREENING              [place]

[place] [facility]



             Test         00:00:00     [code = COLONOSCOPY              



                                       SCREENING]                

 

             Future Scheduled 2019   SHINGLES VACCINES              [place]

n [facility]



             Test         00:00:00     (#1) [code = SHINGLES              



                                       VACCINES (#1)]              

 

             Future Scheduled 1990   Screening for              [place] [facility]

thodist



             Test         00:00:00     malignant neoplasm of              



                                       cervix (procedure)              



                                       [code = 555915722]              







Encounters







        Start   End     Encounter Admission Attending Care    Care    Encounter 

Source



        Date/Time Date/Time Type    Type    Clinicians Facility Department ID   

   

 

        2020 Outpatient         MAFFET, [facility]     [id number]

[address] [place]



        00:00:00 00:00:00                 JEAN-CLAUDE                    [address]     [facility]

i



                                                                        st

 

        2020 Outpatient         MAFFET, [facility]     [id number]

[address] [place] [address]



        00:00:00 00:00:00                 JEAN-CLAUDE                    433     [facility]

i



                                                                        st

 

        2020 Outpatient         MAFFET, [facility]     [id number]

[address] [place]



        00:00:00 00:00:00                 JEANC-LAUDE                    432     [facility]

i



                                                                        st

 

        2020 Outpatient         MAFFET, [facility]     [id number]

[address] [place]



        00:00:00 00:00:00                 JEAN-CLAUDE                    431     [facility]

i



                                                                        st

 

        2020 Outpatient         MAFFET, [facility]     [id number]

[address] [place]



        00:00:00 00:00:00                 JEAN-CLAUDE                    633     [facility]

i



                                                                        st

 

        2020 Outpatient         MAFFET, [facility]     [id number]

[address] [place]



        00:00:00 00:00:00                 JEAN-CLAUDE                    315     Method

i



                                                                        st

 

        2020 Outpatient         MAFFET, [facility]     [id number]

[address] [place]



        00:00:00 00:00:00                 JEAN-CLAUDE                    316     Method

i



                                                                        st

 

        2020 Outpatient         MAFFET, [facility]     [id number]

[address] [place]



        00:00:00 00:00:00                 JEAN-CLAUDE                    098     [facility]

i



                                                                        st

 

        2020 Outpatient         MAFFET, [facility]     [id number]

[address] [place]



        00:00:00 00:00:00                 JEAN-CLAUDE                    773     Method

i



                                                                        st

 

        2020 Outpatient         MAFFET, [facility]     [id number]

[address] [place]



        00:00:00 00:00:00                 JEAN-CLAUDE                    772     Method

i



                                                                        st

 

        2020 Outpatient         MAFFET, [facility]     [id number]

[address] [place]



        00:00:00 00:00:00                 JEAN-CLAUDE                    459     Method

i



                                                                        st

 

        2020 Outpatient         PELUSE, [facility]     [id number]

[address] [place]



        00:00:00 00:00:00                 Holiness                 557     [facility]

monika



                                                                        st

 

        2020 Outpatient         MAFFET, [facility]     021     [id number]

[address] [place]



        00:00:00 00:00:00                 JEAN-CLAUDE                    425     Method

i



                                                                        st

 

        2020 Outpatient         MAFFET, [facility]     [id number]

[address] [place]



        00:00:00 00:00:00                 JEAN-CLAUDE                    801     [facility]

i



                                                                        st







Results







           Test Description Test Time  Test Comments Results    Result     [facility]

e



                                                       Comments   

 

           Airway                  Hermilo Barrera CRNA            [place]

on



                      6                      3/6/2020 10:02            [facility]



                      10:02:11              AMAirwayDate/Time:            



                                            3/6/2020 9:58            



                                            AMPerformed by:            



                                            Hermilo Barrera CRNAAuthorized by:            



                                            Mario Mo MD            



                                            Location:  ORUrgency:            



                                            ElectiveDifficult            



                                            Airway: No            



                                            Anesthesiologist:            



                                            Mario Mo MDResident/CRNA/AA:            



                                            Hermilo Barrera CRNAPerformed by:            



                                            anesthesiologist and            



                                            resident/CRNA/Janette            



                                            genated with 100% O2:            



                                            Yes  C-spine            



                                            Precautions Maintained            



                                            Throughout: Yes  Mask            



                                            Ventilation:  Not            



                                            attemptedFinal Airway            



                                            Type:  Supraglottic            



                                            airwayFinal LMA:            



                                            ClassicLMA Size:            



                                            3Number of Attempts at            



                                            Approach:  1            









                    POC glucose         2020 08:51:56 









                      Test Item  Value      Reference Range Interpretation Comme

nts









             POC glucose (test code = 106 mg/dL    65-99        H            Ope

rator Name: Robin



             06292-2)                                            LaurenDevice ID

:



                                                                 [id number]

able: RN Notified

 

             Lab Interpretation (test code = Abnormal                           

    



             19170-8)                                            



[place] Right  Wo [id number] 17:18:42Hm Interface, 
Radiology Results 2020  5:21 PM CSTEXAMINATION:  MRI KNEE WO 
CONTRASTRIGHTCLINICAL HISTORY: 49 years Female S83.241A Other tear of medial 
meniscus  current injury  rightknee  initial encounter, right knee 
painTECHNIQUE:  Multiplanar multisequence MR imaging of the right knee was 
performed without contrast.COMPARISON:  Bilateral knee x-ray dated 
2/10/2020FINDINGS:Cruciate ligaments: Intact.Menisci: Intact.Collateral 
ligaments: Intact.Bone marrow: Minimal bone marrow edema and subchondral cystic 
changes are present in the superior pole of the patella. Small osteophytes are 
present in the patella. Marrow signal is otherwise unremarkable.Articular 
cartilage: There is moderate chondromalacia involving the superior patellar 
cartilage particularly the median ridge and adjacent aspects of the medial and 
lateral patellar facets, greater laterally. Minimal subchondral edema is present
in the patella. The articular cartilage is otherwise unremarkable.Effusion: 
There is a small joint effusion with mild synovitis.Extensor mechanism: 
Intact.Soft tissues: No focal abnormality.IMPRESSION:1.Degenerative disease 
patellofemoral joint with moderate chondromalacia involving the superior aspect 
of the patella involving the median ridge and adjacent aspects of the medial and
lateral patellar facets, greater laterally.2.Small joint effusion with mild 
synovitis.3.No evidence of cruciate ligament, meniscal, or collateral ligament 
injury.[id number] Olmedo